# Patient Record
(demographics unavailable — no encounter records)

---

## 2025-05-12 NOTE — ASSESSMENT
[FreeTextEntry1] : Impression: Sleeve fracture right patella.  Open continue with a knee immobilizer and crutches minimal weightbearing.  Obviously no gym/sports until further notice a return in 3 weeks with x-rays of the right knee

## 2025-05-12 NOTE — PHYSICAL EXAM
[FreeTextEntry1] : Exam today reveals he has mild swelling to the knee he does have good motion no flexion causes of the pain.  He has obvious tenderness over the inferior pole of the patella negative apprehension.  He has no instability on stress of the cruciate/collateral ligaments no significant joint line tenderness noted.  Popliteal fossa calf neuro vas exam are negative.  Review of outside x-rays 3 views right knee [facility] May 2025 reveal findings consistent with a nondisplaced sleeve fracture of the patella
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
## 2025-05-12 NOTE — CONSULT LETTER
[Dear  ___] : Dear  [unfilled], [Consult Letter:] : I had the pleasure of evaluating your patient, [unfilled]. [Please see my note below.] : Please see my note below. [Consult Closing:] : Thank you very much for allowing me to participate in the care of this patient.  If you have any questions, please do not hesitate to contact me. [Sincerely,] : Sincerely, [FreeTextEntry3] : Dr Stan Christianson JR.

## 2025-06-08 NOTE — HISTORY OF PRESENT ILLNESS
[FreeTextEntry1] : This 10-year-old male returns for reevaluation of a sleeve fracture of the right patella.  The patient is now asymptomatic and he can run and jump without difficulty.

## 2025-06-08 NOTE — ASSESSMENT
[FreeTextEntry1] : Sleeve fracture right patella  Patient will resume his normal physical activity within 10 days.  He will return to this office on a as needed basis.

## 2025-06-08 NOTE — PHYSICAL EXAM
[FreeTextEntry1] : On physical examination there is a full range of motion of the right knee.  There is no swelling or tenderness.  There is no effusion and no varus valgus or AP instability.  Patient has a negative patellar apprehension sign.

## 2025-06-08 NOTE — DATA REVIEWED
[de-identified] : X-ray evaluation of the right knee on 6/4/2025 (AP and lateral views) reveals a healed sleeve fracture of the right patella